# Patient Record
Sex: FEMALE | Race: OTHER | HISPANIC OR LATINO | URBAN - METROPOLITAN AREA
[De-identification: names, ages, dates, MRNs, and addresses within clinical notes are randomized per-mention and may not be internally consistent; named-entity substitution may affect disease eponyms.]

---

## 2022-07-09 ENCOUNTER — TELEPHONE ENCOUNTER (OUTPATIENT)
Dept: URBAN - METROPOLITAN AREA CLINIC 121 | Facility: CLINIC | Age: 47
End: 2022-07-09

## 2022-07-09 RX ORDER — IBUPROFEN 200 MG/1
TABLET, COATED ORAL
Refills: 0 | OUTPATIENT
Start: 2014-04-29 | End: 2014-06-11

## 2022-07-10 ENCOUNTER — TELEPHONE ENCOUNTER (OUTPATIENT)
Dept: URBAN - METROPOLITAN AREA CLINIC 121 | Facility: CLINIC | Age: 47
End: 2022-07-10

## 2022-07-10 RX ORDER — IBUPROFEN 600 MG/1
TABLET, FILM COATED ORAL
Refills: 0 | Status: ACTIVE | COMMUNITY
Start: 2014-06-11

## 2022-07-10 RX ORDER — POLYETHYLENE GLYCOL 3350 17 G/17G
TZKE 1 CAPFULL (17 GMS) PO QHS FOR CONSTIPATION. HOLD ONE DOSE FOR DIARRHEA POWDER ORAL
Refills: 3 | Status: ACTIVE | COMMUNITY
Start: 2014-04-29

## 2022-07-10 RX ORDER — BISMUTH SUBCITRATE POTASSIUM, METRONIDAZOLE, TETRACYCLINE HYDROCHLORIDE 140; 125; 125 MG/1; MG/1; MG/1
USE AS DIRECTED FOR POSITIVE H.PYLORI CAPSULE ORAL TAKE AS DIRECTED
Refills: 0 | Status: ACTIVE | COMMUNITY
Start: 2014-06-23

## 2022-07-10 RX ORDER — OMEPRAZOLE 40 MG/1
ONE TABLET PO DAILY AC-B AFTER FINISHING PREVPAC CAPSULE, DELAYED RELEASE ORAL
Refills: 3 | Status: ACTIVE | COMMUNITY
Start: 2014-06-11

## 2022-09-13 ENCOUNTER — OFFICE VISIT (OUTPATIENT)
Dept: URBAN - METROPOLITAN AREA CLINIC 60 | Facility: CLINIC | Age: 47
End: 2022-09-13

## 2022-09-13 RX ORDER — IBUPROFEN 600 MG/1
TABLET, FILM COATED ORAL
Refills: 0 | Status: ACTIVE | COMMUNITY
Start: 2014-06-11

## 2022-09-13 RX ORDER — POLYETHYLENE GLYCOL 3350 17 G/17G
TZKE 1 CAPFULL (17 GMS) PO QHS FOR CONSTIPATION. HOLD ONE DOSE FOR DIARRHEA POWDER ORAL
Refills: 3 | Status: ACTIVE | COMMUNITY
Start: 2014-04-29

## 2022-09-13 RX ORDER — OMEPRAZOLE 40 MG/1
ONE TABLET PO DAILY AC-B AFTER FINISHING PREVPAC CAPSULE, DELAYED RELEASE ORAL
Refills: 3 | Status: ACTIVE | COMMUNITY
Start: 2014-06-11

## 2022-09-13 RX ORDER — BISMUTH SUBCITRATE POTASSIUM, METRONIDAZOLE, TETRACYCLINE HYDROCHLORIDE 140; 125; 125 MG/1; MG/1; MG/1
USE AS DIRECTED FOR POSITIVE H.PYLORI CAPSULE ORAL TAKE AS DIRECTED
Refills: 0 | Status: ACTIVE | COMMUNITY
Start: 2014-06-23

## 2022-10-28 ENCOUNTER — OFFICE VISIT (OUTPATIENT)
Dept: URBAN - METROPOLITAN AREA CLINIC 60 | Facility: CLINIC | Age: 47
End: 2022-10-28

## 2022-11-29 ENCOUNTER — OFFICE VISIT (OUTPATIENT)
Dept: URBAN - METROPOLITAN AREA CLINIC 63 | Facility: CLINIC | Age: 47
End: 2022-11-29
Payer: COMMERCIAL

## 2022-11-29 ENCOUNTER — DASHBOARD ENCOUNTERS (OUTPATIENT)
Age: 47
End: 2022-11-29

## 2022-11-29 ENCOUNTER — WEB ENCOUNTER (OUTPATIENT)
Dept: URBAN - METROPOLITAN AREA CLINIC 63 | Facility: CLINIC | Age: 47
End: 2022-11-29

## 2022-11-29 VITALS
BODY MASS INDEX: 29.55 KG/M2 | HEIGHT: 63 IN | WEIGHT: 166.8 LBS | HEART RATE: 87 BPM | DIASTOLIC BLOOD PRESSURE: 72 MMHG | OXYGEN SATURATION: 98 % | SYSTOLIC BLOOD PRESSURE: 118 MMHG | TEMPERATURE: 97.8 F

## 2022-11-29 DIAGNOSIS — K29.60 REFLUX GASTRITIS: ICD-10-CM

## 2022-11-29 DIAGNOSIS — R10.13 DYSPEPSIA: ICD-10-CM

## 2022-11-29 PROCEDURE — 99203 OFFICE O/P NEW LOW 30 MIN: CPT | Performed by: INTERNAL MEDICINE

## 2022-11-29 RX ORDER — IBUPROFEN 600 MG/1
TABLET, FILM COATED ORAL
Refills: 0 | Status: ON HOLD | COMMUNITY
Start: 2014-06-11

## 2022-11-29 RX ORDER — POLYETHYLENE GLYCOL 3350 17 G/17G
TZKE 1 CAPFULL (17 GMS) PO QHS FOR CONSTIPATION. HOLD ONE DOSE FOR DIARRHEA POWDER ORAL
Refills: 3 | Status: ON HOLD | COMMUNITY
Start: 2014-04-29

## 2022-11-29 RX ORDER — OMEPRAZOLE 40 MG/1
1 CAPSULE 30 MINUTES BEFORE MORNING MEAL CAPSULE, DELAYED RELEASE ORAL ONCE A DAY
Qty: 30 | Refills: 2 | OUTPATIENT
Start: 2022-11-29

## 2022-11-29 RX ORDER — BISMUTH SUBCITRATE POTASSIUM, METRONIDAZOLE, TETRACYCLINE HYDROCHLORIDE 140; 125; 125 MG/1; MG/1; MG/1
USE AS DIRECTED FOR POSITIVE H.PYLORI CAPSULE ORAL TAKE AS DIRECTED
Refills: 0 | Status: ON HOLD | COMMUNITY
Start: 2014-06-23

## 2022-11-29 RX ORDER — OMEPRAZOLE 40 MG/1
ONE TABLET PO DAILY AC-B AFTER FINISHING PREVPAC CAPSULE, DELAYED RELEASE ORAL
Refills: 3 | Status: ON HOLD | COMMUNITY
Start: 2014-06-11

## 2022-11-29 RX ORDER — METFORMIN HYDROCHLORIDE 1000 MG/1
TABLET, FILM COATED ORAL
Qty: 180 TABLET | Refills: 1 | Status: ACTIVE | COMMUNITY

## 2022-11-29 NOTE — HPI-HPI
Patient was seen in the past by Dr Mtz in 2014. Epigastric pain with Helicobacter pylori positive gastritis Will definitely need treatment in view of the inflammation as well as symptomatology in the face of a positive biopsy showing H.pylori Has been on large dose Ibuprofen which could also be contributing to her symptoms Will need continued PPI therapy even after the treatment for H.pylori is finished Constipation with no red flag signs Yield of colonoscopy in a case like this is very low Given the relative lack of improvement with Miralax, it could suggest more of an issue with poor motility.  11/22: I am seen the patient for first time today at the office, patient with chronic reflux, pain and emesis with epigastric pain,  will plan trial with PPI and  EGD.  Patient had few years ago cardiac evaluation as per patient was normal.

## 2022-12-06 ENCOUNTER — LAB OUTSIDE AN ENCOUNTER (OUTPATIENT)
Dept: URBAN - METROPOLITAN AREA CLINIC 63 | Facility: CLINIC | Age: 47
End: 2022-12-06

## 2023-02-03 ENCOUNTER — OFFICE VISIT (OUTPATIENT)
Dept: URBAN - METROPOLITAN AREA SURGERY CENTER 4 | Facility: SURGERY CENTER | Age: 48
End: 2023-02-03

## 2023-02-22 ENCOUNTER — OFFICE VISIT (OUTPATIENT)
Dept: URBAN - METROPOLITAN AREA CLINIC 63 | Facility: CLINIC | Age: 48
End: 2023-02-22